# Patient Record
Sex: MALE | Race: WHITE | NOT HISPANIC OR LATINO | Employment: FULL TIME | URBAN - METROPOLITAN AREA
[De-identification: names, ages, dates, MRNs, and addresses within clinical notes are randomized per-mention and may not be internally consistent; named-entity substitution may affect disease eponyms.]

---

## 2019-08-30 ENCOUNTER — HISTORICAL (OUTPATIENT)
Dept: LAB | Facility: HOSPITAL | Age: 59
End: 2019-08-30

## 2019-08-30 LAB
EST. AVERAGE GLUCOSE BLD GHB EST-MCNC: 111 MG/DL
HBA1C MFR BLD: 5.5 % (ref 4.2–6.3)

## 2021-02-17 ENCOUNTER — HISTORICAL (OUTPATIENT)
Dept: INTENSIVE CARE | Facility: HOSPITAL | Age: 61
End: 2021-02-17

## 2022-02-18 ENCOUNTER — HISTORICAL (OUTPATIENT)
Dept: ADMINISTRATIVE | Facility: HOSPITAL | Age: 62
End: 2022-02-18

## 2022-02-18 LAB
ABS NEUT (OLG): 3.14 (ref 2.1–9.2)
ALBUMIN SERPL-MCNC: 4.1 G/DL (ref 3.4–4.8)
ALBUMIN/GLOB SERPL: 1.3 {RATIO} (ref 1.1–2)
ALP SERPL-CCNC: 51 U/L (ref 40–150)
ALT SERPL-CCNC: 27 U/L (ref 0–55)
APPEARANCE, UA: CLEAR
APTT PPP: 34.1 S (ref 23.2–33.7)
AST SERPL-CCNC: 21 U/L (ref 5–34)
BACTERIA SPEC CULT: NORMAL
BASOPHILS # BLD AUTO: 0 10*3/UL (ref 0–0.2)
BASOPHILS NFR BLD AUTO: 0 %
BILIRUB SERPL-MCNC: 1.2 MG/DL
BILIRUB UR QL STRIP: NORMAL
BILIRUBIN DIRECT+TOT PNL SERPL-MCNC: 0.4 (ref 0–0.5)
BILIRUBIN DIRECT+TOT PNL SERPL-MCNC: 0.8 (ref 0–0.8)
BUDDING YEAST: NORMAL
BUN SERPL-MCNC: 16.4 MG/DL (ref 8.4–25.7)
CALCIUM SERPL-MCNC: 9.2 MG/DL (ref 8.7–10.5)
CASTS, UA: NORMAL
CHLORIDE SERPL-SCNC: 102 MMOL/L (ref 98–107)
CO2 SERPL-SCNC: 22 MMOL/L (ref 23–31)
COLOR UR: NORMAL
CREAT SERPL-MCNC: 1.03 MG/DL (ref 0.73–1.18)
CRYSTALS: NORMAL
EOSINOPHIL # BLD AUTO: 0.1 10*3/UL (ref 0–0.9)
EOSINOPHIL NFR BLD AUTO: 2 %
ERYTHROCYTE [DISTWIDTH] IN BLOOD BY AUTOMATED COUNT: 12.4 % (ref 11.5–17)
GLOBULIN SER-MCNC: 3.1 G/DL (ref 2.4–3.5)
GLUCOSE (UA): NEGATIVE
GLUCOSE SERPL-MCNC: 103 MG/DL (ref 82–115)
HCT VFR BLD AUTO: 44.7 % (ref 42–52)
HEMOLYSIS INTERF INDEX SERPL-ACNC: 18
HGB BLD-MCNC: 16.1 G/DL (ref 14–18)
HGB UR QL STRIP: NEGATIVE
ICTERIC INTERF INDEX SERPL-ACNC: 1
INR PPP: 1 (ref 0–1.3)
KETONES UR QL STRIP: NORMAL
LEUKOCYTE ESTERASE UR QL STRIP: NEGATIVE
LIPEMIC INTERF INDEX SERPL-ACNC: 16
LYMPHOCYTES # BLD AUTO: 2 10*3/UL (ref 0.6–4.6)
LYMPHOCYTES NFR BLD AUTO: 35 %
MANUAL DIFF? (OHS): NO
MCH RBC QN AUTO: 33.8 PG (ref 27–31)
MCHC RBC AUTO-ENTMCNC: 36 G/DL (ref 33–36)
MCV RBC AUTO: 93.7 FL (ref 80–94)
MONOCYTES # BLD AUTO: 0.5 10*3/UL (ref 0.1–1.3)
MONOCYTES NFR BLD AUTO: 9 %
MRSA SCREEN BY PCR: NEGATIVE
NEUTROPHILS # BLD AUTO: 3.14 10*3/UL (ref 2.1–9.2)
NEUTROPHILS NFR BLD AUTO: 54 %
NITRITE UR QL STRIP: NEGATIVE
PH UR STRIP: 5.5 [PH] (ref 5–9)
PLATELET # BLD AUTO: 182 10*3/UL (ref 130–400)
PMV BLD AUTO: 9.7 FL (ref 9.4–12.4)
POTASSIUM SERPL-SCNC: 3.8 MMOL/L (ref 3.5–5.1)
PROBE CHECK (OHS): NORMAL
PROT SERPL-MCNC: 7.2 G/DL (ref 5.8–7.6)
PROT UR QL STRIP: NORMAL
PROTHROMBIN TIME: 13.4 S (ref 12.5–14.5)
RBC # BLD AUTO: 4.77 10*6/UL (ref 4.7–6.1)
RBC #/AREA URNS HPF: NORMAL /[HPF] (ref 0–2)
SARS-COV-2 AG RESP QL IA.RAPID: NEGATIVE
SMALL ROUND CELLS, UA: NORMAL
SODIUM SERPL-SCNC: 138 MMOL/L (ref 136–145)
SP GR UR STRIP: 1.02 (ref 1–1.03)
SPERM URNS QL MICRO: NORMAL
SQUAMOUS EPITHELIAL, UA: NORMAL (ref 0–4)
UROBILINOGEN UR STRIP-ACNC: 1
WBC # SPEC AUTO: 5.9 10*3/UL (ref 4.5–11.5)
WBC #/AREA URNS HPF: NORMAL /[HPF] (ref 0–2)

## 2022-02-21 ENCOUNTER — HISTORICAL (OUTPATIENT)
Dept: ADMINISTRATIVE | Facility: HOSPITAL | Age: 62
End: 2022-02-21

## 2022-04-07 ENCOUNTER — HISTORICAL (OUTPATIENT)
Dept: ADMINISTRATIVE | Facility: HOSPITAL | Age: 62
End: 2022-04-07
Payer: COMMERCIAL

## 2022-04-24 VITALS
SYSTOLIC BLOOD PRESSURE: 135 MMHG | WEIGHT: 271.19 LBS | BODY MASS INDEX: 36.73 KG/M2 | HEIGHT: 72 IN | DIASTOLIC BLOOD PRESSURE: 92 MMHG

## 2022-05-02 ENCOUNTER — OFFICE VISIT (OUTPATIENT)
Dept: URGENT CARE | Facility: CLINIC | Age: 62
End: 2022-05-02
Payer: COMMERCIAL

## 2022-05-02 VITALS
HEIGHT: 72 IN | DIASTOLIC BLOOD PRESSURE: 80 MMHG | WEIGHT: 251 LBS | RESPIRATION RATE: 18 BRPM | BODY MASS INDEX: 34 KG/M2 | SYSTOLIC BLOOD PRESSURE: 130 MMHG | TEMPERATURE: 99 F | OXYGEN SATURATION: 98 % | HEART RATE: 89 BPM

## 2022-05-02 DIAGNOSIS — R05.9 COUGH: ICD-10-CM

## 2022-05-02 DIAGNOSIS — J02.9 SORE THROAT: Primary | ICD-10-CM

## 2022-05-02 DIAGNOSIS — U07.1 COVID-19: ICD-10-CM

## 2022-05-02 DIAGNOSIS — R50.9 FEVER, UNSPECIFIED FEVER CAUSE: ICD-10-CM

## 2022-05-02 LAB
CTP QC/QA: YES
FLUAV AG NPH QL: NEGATIVE
FLUBV AG NPH QL: NEGATIVE
SARS-COV-2 AG RESP QL IA.RAPID: POSITIVE
SARS-COV-2 RDRP RESP QL NAA+PROBE: POSITIVE

## 2022-05-02 PROCEDURE — 87811 SARS-COV-2 COVID19 W/OPTIC: CPT | Mod: QW,,, | Performed by: FAMILY MEDICINE

## 2022-05-02 PROCEDURE — 3008F PR BODY MASS INDEX (BMI) DOCUMENTED: ICD-10-PCS | Mod: CPTII,,, | Performed by: FAMILY MEDICINE

## 2022-05-02 PROCEDURE — 87804 POCT INFLUENZA A/B: ICD-10-PCS | Mod: QW,,, | Performed by: FAMILY MEDICINE

## 2022-05-02 PROCEDURE — 3008F BODY MASS INDEX DOCD: CPT | Mod: CPTII,,, | Performed by: FAMILY MEDICINE

## 2022-05-02 PROCEDURE — 3075F PR MOST RECENT SYSTOLIC BLOOD PRESS GE 130-139MM HG: ICD-10-PCS | Mod: CPTII,,, | Performed by: FAMILY MEDICINE

## 2022-05-02 PROCEDURE — 3079F DIAST BP 80-89 MM HG: CPT | Mod: CPTII,,, | Performed by: FAMILY MEDICINE

## 2022-05-02 PROCEDURE — 87811 SARS CORONAVIRUS 2 ANTIGEN POCT, MANUAL READ: ICD-10-PCS | Mod: QW,,, | Performed by: FAMILY MEDICINE

## 2022-05-02 PROCEDURE — 1159F PR MEDICATION LIST DOCUMENTED IN MEDICAL RECORD: ICD-10-PCS | Mod: CPTII,,, | Performed by: FAMILY MEDICINE

## 2022-05-02 PROCEDURE — 99213 PR OFFICE/OUTPT VISIT, EST, LEVL III, 20-29 MIN: ICD-10-PCS | Mod: 25,,, | Performed by: FAMILY MEDICINE

## 2022-05-02 PROCEDURE — 3075F SYST BP GE 130 - 139MM HG: CPT | Mod: CPTII,,, | Performed by: FAMILY MEDICINE

## 2022-05-02 PROCEDURE — 99213 OFFICE O/P EST LOW 20 MIN: CPT | Mod: 25,,, | Performed by: FAMILY MEDICINE

## 2022-05-02 PROCEDURE — 3079F PR MOST RECENT DIASTOLIC BLOOD PRESSURE 80-89 MM HG: ICD-10-PCS | Mod: CPTII,,, | Performed by: FAMILY MEDICINE

## 2022-05-02 PROCEDURE — 1159F MED LIST DOCD IN RCRD: CPT | Mod: CPTII,,, | Performed by: FAMILY MEDICINE

## 2022-05-02 PROCEDURE — 87804 INFLUENZA ASSAY W/OPTIC: CPT | Mod: 59,QW,, | Performed by: FAMILY MEDICINE

## 2022-05-02 RX ORDER — HYDRALAZINE HYDROCHLORIDE 25 MG/1
25 TABLET, FILM COATED ORAL 2 TIMES DAILY
COMMUNITY
Start: 2021-10-13

## 2022-05-02 RX ORDER — METOPROLOL TARTRATE 50 MG/1
50 TABLET ORAL 2 TIMES DAILY
COMMUNITY
Start: 2022-02-23

## 2022-05-02 RX ORDER — LOSARTAN POTASSIUM AND HYDROCHLOROTHIAZIDE 25; 100 MG/1; MG/1
1 TABLET ORAL DAILY
COMMUNITY
Start: 2021-11-03

## 2022-05-02 RX ORDER — RIVAROXABAN 20 MG/1
20 TABLET, FILM COATED ORAL DAILY
COMMUNITY
Start: 2022-04-19

## 2022-05-02 RX ORDER — SPIRONOLACTONE 25 MG/1
25 TABLET ORAL DAILY
COMMUNITY
Start: 2022-04-13

## 2022-05-02 NOTE — PROGRESS NOTES
Subjective:       Patient ID: Spenser Craven is a 62 y.o. male.    Vitals:  vitals were not taken for this visit.     Chief Complaint: No chief complaint on file.    HPI  ROS    Objective:      Physical Exam      Assessment:       No diagnosis found.      Plan:         There are no diagnoses linked to this encounter.

## 2022-05-02 NOTE — PROGRESS NOTES
Subjective:       Patient ID: Spenser Craven is a 62 y.o. male.    Vitals:  height is 6' (1.829 m) and weight is 113.9 kg (251 lb). His temperature is 98.7 °F (37.1 °C). His blood pressure is 130/80 and his pulse is 89. His respiration is 18 and oxygen saturation is 98%.     Chief Complaint: Fever (Patient presents to clinic with c/o sinus problems, fever, ST, and cough. Sx started Saturday.), Sinus Problem, Cough, Sore Throat, and Fatigue    Fever   This is a new problem. The problem occurs intermittently. The problem has been unchanged. The maximum temperature noted was 100 to 100.9 F. The temperature was taken using an oral thermometer. Associated symptoms include coughing and a sore throat. He has tried acetaminophen and fluids for the symptoms. The treatment provided no relief.   Sinus Problem  This is a new problem. The problem is unchanged. The maximum temperature recorded prior to his arrival was 100.4 - 100.9 F. He is experiencing no pain. Associated symptoms include coughing and a sore throat. Past treatments include acetaminophen, lying down, oral decongestants, saline sprays, sitting up and spray decongestants. The treatment provided no relief.   Cough  This is a new problem. The problem has been unchanged. The problem occurs constantly. The cough is productive of sputum. Associated symptoms include a fever and a sore throat. The symptoms are aggravated by dust, exercise, fumes, pollens, lying down and stress. Risk factors for lung disease include smoking/tobacco exposure. He has tried body position changes, OTC cough suppressant, prescription cough suppressant and rest for the symptoms. The treatment provided no relief.   Sore Throat   This is a new problem. The problem has been unchanged. The maximum temperature recorded prior to his arrival was 100.4 - 100.9 F. Associated symptoms include coughing. He has tried cool liquids, gargles and acetaminophen for the symptoms. The treatment provided no relief.    Fatigue  This is a new problem. The problem occurs constantly. The problem has been unchanged. Associated symptoms include coughing, fatigue, a fever and a sore throat. The symptoms are aggravated by drinking and eating. He has tried acetaminophen, drinking, lying down, position changes, relaxation, rest and sleep for the symptoms. The treatment provided no relief.       Constitution: Positive for fatigue and fever.   HENT: Positive for sore throat.    Respiratory: Positive for cough.        Objective:      Physical Exam   Constitutional: He is oriented to person, place, and time. He appears well-developed. No distress.   HENT:   Ears:   Right Ear: External ear normal.   Left Ear: External ear normal.   Nose: Rhinorrhea present.   Mouth/Throat: Uvula is midline and mucous membranes are normal. No uvula swelling. Cobblestoning present. No posterior oropharyngeal edema. Tonsils are 0 on the right. Tonsils are 0 on the left. No tonsillar exudate.   Eyes: Pupils are equal, round, and reactive to light. Right eye exhibits no discharge. Left eye exhibits no discharge.   Neck: Neck supple. No tracheal deviation present.   Cardiovascular: Normal rate, regular rhythm and normal heart sounds.   No murmur heard.  Pulmonary/Chest: Effort normal and breath sounds normal. No stridor. No respiratory distress. He has no wheezes.   Lymphadenopathy:     He has no cervical adenopathy.   Neurological: He is alert and oriented to person, place, and time.   Skin: Skin is warm and dry.   Nursing note and vitals reviewed.        Assessment:       1. Sore throat    2. Fever, unspecified fever cause    3. Cough    4. COVID-19          Plan:         Sore throat  -     POCT Influenza A/B  -     SARS Coronavirus 2 Antigen, POCT Manual Read    Fever, unspecified fever cause  -     POCT Influenza A/B  -     SARS Coronavirus 2 Antigen, POCT Manual Read    Cough  -     POCT Influenza A/B  -     SARS Coronavirus 2 Antigen, POCT Manual  Read    COVID-19  -     POCT Influenza A/B  -     SARS Coronavirus 2 Antigen, POCT Manual Read         COVID positive.  Flu negative.   Quarantine for 5 days at home, if symptoms improve can stop home quarantine and use mask when around others for additional 5 days.  ER precautions given.

## 2022-05-03 DIAGNOSIS — U07.1 COVID-19 VIRUS DETECTED: ICD-10-CM

## 2022-05-15 RX ORDER — CETIRIZINE HYDROCHLORIDE 10 MG/1
10 TABLET ORAL DAILY
COMMUNITY

## 2022-05-15 RX ORDER — CYCLOBENZAPRINE HCL 10 MG
10 TABLET ORAL 3 TIMES DAILY PRN
COMMUNITY

## 2022-05-15 RX ORDER — AZELASTINE 1 MG/ML
1 SPRAY, METERED NASAL 2 TIMES DAILY
COMMUNITY

## 2022-05-15 RX ORDER — TADALAFIL 5 MG/1
5 TABLET ORAL DAILY PRN
COMMUNITY

## 2022-05-17 ENCOUNTER — OFFICE VISIT (OUTPATIENT)
Dept: NEUROLOGY | Facility: CLINIC | Age: 62
End: 2022-05-17
Payer: COMMERCIAL

## 2022-05-17 VITALS
SYSTOLIC BLOOD PRESSURE: 130 MMHG | WEIGHT: 250 LBS | HEIGHT: 72 IN | DIASTOLIC BLOOD PRESSURE: 80 MMHG | BODY MASS INDEX: 33.86 KG/M2

## 2022-05-17 DIAGNOSIS — G47.33 OSA (OBSTRUCTIVE SLEEP APNEA): Primary | ICD-10-CM

## 2022-05-17 PROCEDURE — 3008F PR BODY MASS INDEX (BMI) DOCUMENTED: ICD-10-PCS | Mod: CPTII,S$GLB,, | Performed by: NURSE PRACTITIONER

## 2022-05-17 PROCEDURE — 99999 PR PBB SHADOW E&M-EST. PATIENT-LVL III: CPT | Mod: PBBFAC,,, | Performed by: NURSE PRACTITIONER

## 2022-05-17 PROCEDURE — 1159F PR MEDICATION LIST DOCUMENTED IN MEDICAL RECORD: ICD-10-PCS | Mod: CPTII,S$GLB,, | Performed by: NURSE PRACTITIONER

## 2022-05-17 PROCEDURE — 99213 OFFICE O/P EST LOW 20 MIN: CPT | Mod: S$GLB,,, | Performed by: NURSE PRACTITIONER

## 2022-05-17 PROCEDURE — 99213 PR OFFICE/OUTPT VISIT, EST, LEVL III, 20-29 MIN: ICD-10-PCS | Mod: S$GLB,,, | Performed by: NURSE PRACTITIONER

## 2022-05-17 PROCEDURE — 3008F BODY MASS INDEX DOCD: CPT | Mod: CPTII,S$GLB,, | Performed by: NURSE PRACTITIONER

## 2022-05-17 PROCEDURE — 99999 PR PBB SHADOW E&M-EST. PATIENT-LVL III: ICD-10-PCS | Mod: PBBFAC,,, | Performed by: NURSE PRACTITIONER

## 2022-05-17 PROCEDURE — 3079F PR MOST RECENT DIASTOLIC BLOOD PRESSURE 80-89 MM HG: ICD-10-PCS | Mod: CPTII,S$GLB,, | Performed by: NURSE PRACTITIONER

## 2022-05-17 PROCEDURE — 1159F MED LIST DOCD IN RCRD: CPT | Mod: CPTII,S$GLB,, | Performed by: NURSE PRACTITIONER

## 2022-05-17 PROCEDURE — 3075F PR MOST RECENT SYSTOLIC BLOOD PRESS GE 130-139MM HG: ICD-10-PCS | Mod: CPTII,S$GLB,, | Performed by: NURSE PRACTITIONER

## 2022-05-17 PROCEDURE — 3079F DIAST BP 80-89 MM HG: CPT | Mod: CPTII,S$GLB,, | Performed by: NURSE PRACTITIONER

## 2022-05-17 PROCEDURE — 3075F SYST BP GE 130 - 139MM HG: CPT | Mod: CPTII,S$GLB,, | Performed by: NURSE PRACTITIONER

## 2022-05-17 RX ORDER — ATORVASTATIN CALCIUM 10 MG/1
10 TABLET, FILM COATED ORAL
COMMUNITY
Start: 2022-04-20

## 2022-05-17 NOTE — PROGRESS NOTES
Established SANDIP Patient   SUBJECTIVE:  Patient ID: Spenser Craven   Chief Complaint: F/U SANDIP. (Wears CPAP qhs and is tolerating it well. Sleeps 8 hrs a night and sleeps all night. Awakens refreshed and occs has EDS. Pt states he was taking 2 naps a day he states he was fatigued from his A FIB. Sleeps better and longer with the CPAP. Carr s not change mask and tubing on a regular basis. States that his mask sometimes leaks due to the position his jaw is in. [facility].)    History of Present Illness:  Spenser Craven is a 62 y.o. male who presents for SANDIP follow-up        05/17/2022 - Interval History:    Here for PAP follow up. Wears CPAP qhs and is tolerating it well. Sleeps better and longer with the CPAP.     Sleeps 8 hrs a night and sleeps all night.     Awakens refreshed and occasionally has EDS. Pt states he was taking 2 naps a day (he states he had fatigue d/t  A FIB. Last month had ablation which was effective. EDS resolved).     States that his mask sometimes leaks due to the position his jaw is in.   [facility]. Able to get supplies    Current Medications:    Current Outpatient Medications:     atorvastatin (LIPITOR) 10 MG tablet, Take 10 mg by mouth., Disp: , Rfl:     azelastine (ASTELIN) 137 mcg (0.1 %) nasal spray, 1 spray by Nasal route 2 (two) times daily., Disp: , Rfl:     cetirizine (ZYRTEC) 10 MG tablet, Take 10 mg by mouth once daily., Disp: , Rfl:     cyclobenzaprine (FLEXERIL) 10 MG tablet, Take 10 mg by mouth 3 (three) times daily as needed for Muscle spasms., Disp: , Rfl:     hydrALAZINE (APRESOLINE) 25 MG tablet, Take 25 mg by mouth 2 (two) times a day., Disp: , Rfl:     losartan-hydrochlorothiazide 100-25 mg (HYZAAR) 100-25 mg per tablet, Take 1 tablet by mouth once daily., Disp: , Rfl:     metoprolol tartrate (LOPRESSOR) 50 MG tablet, Take 50 mg by mouth 2 (two) times daily., Disp: , Rfl:     spironolactone (ALDACTONE) 25 MG tablet, Take 25 mg by mouth once daily., Disp: , Rfl:      tadalafiL (CIALIS) 5 MG tablet, Take 5 mg by mouth daily as needed for Erectile Dysfunction., Disp: , Rfl:     XARELTO 20 mg Tab, Take 20 mg by mouth once daily., Disp: , Rfl:     Review of Systems - as per HPI, otherwise a balanced 10 systems review is negative.    OBJECTIVE:  Vitals:  /80   Ht 6' (1.829 m)   Wt 113.4 kg (250 lb)   BMI 33.91 kg/m²      Physical Exam:  Constitutional: he appears well-developed and well-nourished. he is well groomed. NAD   HENT:    Head: Normocephalic and atraumatic  Eyes: Conjunctivae and EOM are normal  Heart: RRR  Pulmonary: Normal lung sounds  Musculoskeletal: Normal range of motion.   Skin: Skin is warm and dry.  Psychiatric: Mood and affect are normal    Neuro: Patient is alert and oriented to person, place, and time. Language is intact and fluent. Speech is clear and fluent. Recent and remote memory are intact.  Normal attention and concentration.  Facial movement is symmetric. Moves all 4 extremities against gravity. Gait and station normal.      Review of Data:      Compliance Report  Last 30 days  Usage- 100%  Usage > 4 hrs - 100%  AHI - 1.6    Mobile today- 3    Labs:  Office Visit on 05/02/2022   Component Date Value Ref Range Status    Rapid Influenza A Ag 05/02/2022 Negative  Negative Final    Rapid Influenza B Ag 05/02/2022 Negative  Negative Final     Acceptable 05/02/2022 Yes   Final    SARS Coronavirus 2 Antigen 05/02/2022 Positive (A) Negative Final-Edited     Acceptable 05/02/2022 Yes   Final-Edited    POC Rapid COVID 05/02/2022 Positive (A) Negative Final     Acceptable 05/02/2022 Yes   Final   Historical on 02/18/2022   Component Date Value Ref Range Status    WBC 02/18/2022 5.9  4.5 - 11.5 Final    RBC 02/18/2022 4.77  4.70 - 6.10 Final    Hgb 02/18/2022 16.1  14.0 - 18.0 Final    Hct 02/18/2022 44.7  42.0 - 52.0 Final    MCV 02/18/2022 93.7  80.0 - 94.0 Final    MCH 02/18/2022 33.8  27.0 -  31.0 Final    MCHC 02/18/2022 36.0  33.0 - 36.0 Final    RDW 02/18/2022 12.4  11.5 - 17.0 Final    Platelet 02/18/2022 182  130 - 400 Final    MPV 02/18/2022 9.7  9.4 - 12.4 Final    Abs Neut 02/18/2022 3.14  2.10 - 9.20 Final    Manual Diff? 02/18/2022 No   Final    Neut % 02/18/2022 54   Final    Lymph % 02/18/2022 35   Final    Mono Auto 02/18/2022 9   Final    Eos Auto 02/18/2022 2   Final    Basophil Auto 02/18/2022 0   Final    Lymph # 02/18/2022 2.0  0.6 - 4.6 Final    Neut # 02/18/2022 3.14  2.10 - 9.20 Final    Abs Mono 02/18/2022 0.5  0.1 - 1.3 Final    Abs Eos 02/18/2022 0.1  0.0 - 0.9 Final    Abs Baso 02/18/2022 0.0  0.0 - 0.2 Final    Color, UA 02/18/2022 DK YELLOW  YELLOW Final    Appearance, UA 02/18/2022 CLEAR  Clear Final    Specific Gravity,UA 02/18/2022 1.025  1.000 - 1.032 Final    pH, UA 02/18/2022 5.5  5.0 - 9.0 Final    Protein, UA 02/18/2022 Trace  Negative Final    Glucose, UA 02/18/2022 Negative  Negative Final    Ketones, UA 02/18/2022 Trace  Negative Final    Bilirubin (UA) 02/18/2022 1+  Negative Final    Occult Blood UA 02/18/2022 Negative  Negative Final    Urobilinogen, UA 02/18/2022 1.0   Final    Nitrite, UA 02/18/2022 Negative  Negative Final    Leukocytes, UA 02/18/2022 Negative  Negative Final    WBC, UA 02/18/2022 NONE SEEN  0 - 2 Final    RBC, UA 02/18/2022 NONE SEEN  0 - 2 Final    Squam Epithel, UA 02/18/2022 NONE SEEN  0 - 4 Final    Bacteria 02/18/2022 NONE SEEN  None Seen Final    Yeast, UA 02/18/2022 NOT PRESENT   Final    Sperm, UA 02/18/2022 NOT PRESENT   Final    Crystals 02/18/2022 NOT PRESENT   Final    Small Round Cells, UA 02/18/2022 NOT PRESENT   Final    CASTS, UA 02/18/2022 NONE SEEN   Final    Sodium Level 02/18/2022 138  136 - 145 Final    Potassium Level 02/18/2022 3.8  3.5 - 5.1 Final    Chloride 02/18/2022 102  98 - 107 Final    Carbon Dioxide 02/18/2022 22  23 - 31 Final    Glucose Level 02/18/2022 103  82 - 115  Final    Blood Urea Nitrogen 02/18/2022 16.4  8.4 - 25.7 Final    Creatinine 02/18/2022 1.03  0.73 - 1.18 Final    Calcium Level Total 02/18/2022 9.2  8.7 - 10.5 Final    Albumin Level 02/18/2022 4.1  3.4 - 4.8 Final    Protein Total 02/18/2022 7.2  5.8 - 7.6 Final    Globulin 02/18/2022 3.1  2.4 - 3.5 Final    Albumin/Globulin Ratio 02/18/2022 1.3  1.1 - 2.0 Final    Alkaline Phosphatase 02/18/2022 51  40 - 150 Final    Bilirubin Total 02/18/2022 1.2  <=1.5 Final    Bilirubin Direct 02/18/2022 0.4  0.0 - 0.5 Final    Bilirubin Indirect 02/18/2022 0.80  0.00 - 0.80 Final    Aspartate Aminotransferase 02/18/2022 21  5 - 34 Final    Alanine Aminotransferase 02/18/2022 27  0 - 55 Final    Hemolysis 02/18/2022 18   Final    Icterus 02/18/2022 1   Final    Lipemia 02/18/2022 16   Final    Estimated GFR- 02/18/2022 >60   Final    Estimated GFR-Non  02/18/2022 >60   Final    PT 02/18/2022 13.4  12.5 - 14.5 Final    INR 02/18/2022 1.0  0.0 - 1.3 Final    PTT 02/18/2022 34.1  23.2 - 33.7 Final    SARS Coronavirus 2 Antigen 02/18/2022 NEGATIVE  NEGATIVE Final    MRSA SCREEN BY PCR 02/18/2022 Negative  Negative Final    Probe Check 02/18/2022 Pass   Final          ASSESSMENT:  1. SANDIP (obstructive sleep apnea)        PLAN:    - Encouraged continued use of PAP, > 4 hrs nightly.  - Drowsy driving may still occur despite PAP use.   - Clinical follow up and replacement of supplies discussed.  - RTC in 1 yr    Questions and concerns were sought and answered to the patient's stated verbal satisfaction.    The patient verbalizes understanding and agreement with the above stated treatment plan.   Dr. Bush was available during today's encounter.     Items discussed include acute and/or chronic neurological, sleep, or other issues and their attendant differential diagnoses.  Potential for additional testing, treatment options, and prognosis also discussed.    _*__single dx  ___multiple issues/ diagnoses  ___ low _*_mod ___ high complexity of data  _*__low __mod ___ high risks     Medical Decision Making (MDM) used for CPT choice:  _*__low  ___moderate  ____high      VINICIO Dumont  [facility]  [phone]

## 2022-10-18 ENCOUNTER — OFFICE VISIT (OUTPATIENT)
Dept: URGENT CARE | Facility: CLINIC | Age: 62
End: 2022-10-18
Payer: COMMERCIAL

## 2022-10-18 VITALS
WEIGHT: 250 LBS | SYSTOLIC BLOOD PRESSURE: 122 MMHG | HEIGHT: 72 IN | TEMPERATURE: 98 F | DIASTOLIC BLOOD PRESSURE: 72 MMHG | HEART RATE: 87 BPM | RESPIRATION RATE: 18 BRPM | BODY MASS INDEX: 33.86 KG/M2 | OXYGEN SATURATION: 100 %

## 2022-10-18 DIAGNOSIS — J02.9 SORE THROAT: Primary | ICD-10-CM

## 2022-10-18 DIAGNOSIS — H65.191 ACUTE EFFUSION OF RIGHT EAR: ICD-10-CM

## 2022-10-18 LAB
CTP QC/QA: YES
S PYO RRNA THROAT QL PROBE: NEGATIVE

## 2022-10-18 PROCEDURE — 1160F PR REVIEW ALL MEDS BY PRESCRIBER/CLIN PHARMACIST DOCUMENTED: ICD-10-PCS | Mod: CPTII,,, | Performed by: REGISTERED NURSE

## 2022-10-18 PROCEDURE — 1159F PR MEDICATION LIST DOCUMENTED IN MEDICAL RECORD: ICD-10-PCS | Mod: CPTII,,, | Performed by: REGISTERED NURSE

## 2022-10-18 PROCEDURE — 87880 POCT RAPID STREP A: ICD-10-PCS | Mod: QW,,, | Performed by: REGISTERED NURSE

## 2022-10-18 PROCEDURE — 3074F SYST BP LT 130 MM HG: CPT | Mod: CPTII,,, | Performed by: REGISTERED NURSE

## 2022-10-18 PROCEDURE — 1160F RVW MEDS BY RX/DR IN RCRD: CPT | Mod: CPTII,,, | Performed by: REGISTERED NURSE

## 2022-10-18 PROCEDURE — 3078F DIAST BP <80 MM HG: CPT | Mod: CPTII,,, | Performed by: REGISTERED NURSE

## 2022-10-18 PROCEDURE — 1159F MED LIST DOCD IN RCRD: CPT | Mod: CPTII,,, | Performed by: REGISTERED NURSE

## 2022-10-18 PROCEDURE — 3074F PR MOST RECENT SYSTOLIC BLOOD PRESSURE < 130 MM HG: ICD-10-PCS | Mod: CPTII,,, | Performed by: REGISTERED NURSE

## 2022-10-18 PROCEDURE — 99212 PR OFFICE/OUTPT VISIT, EST, LEVL II, 10-19 MIN: ICD-10-PCS | Mod: S$PBB,25,, | Performed by: REGISTERED NURSE

## 2022-10-18 PROCEDURE — 87880 STREP A ASSAY W/OPTIC: CPT | Mod: QW,,, | Performed by: REGISTERED NURSE

## 2022-10-18 PROCEDURE — 99212 OFFICE O/P EST SF 10 MIN: CPT | Mod: S$PBB,25,, | Performed by: REGISTERED NURSE

## 2022-10-18 PROCEDURE — 3078F PR MOST RECENT DIASTOLIC BLOOD PRESSURE < 80 MM HG: ICD-10-PCS | Mod: CPTII,,, | Performed by: REGISTERED NURSE

## 2022-10-18 RX ORDER — SOTALOL HYDROCHLORIDE 80 MG/1
TABLET ORAL
COMMUNITY
Start: 2022-07-27

## 2022-10-18 NOTE — PROGRESS NOTES
Subjective:       Patient ID: Spenser Craven is a 62 y.o. male.    Vitals:  height is 6' (1.829 m) and weight is 113.4 kg (250 lb). His temperature is 98.2 °F (36.8 °C). His blood pressure is 122/72 and his pulse is 87. His respiration is 18 and oxygen saturation is 100%.     Chief Complaint: Sinus Problem (Sore throat, rt ear ache, cough x yesterday :started tamiflu on Saturday- never got tested for flu but was exposed to it so dr called it in )    Patient presents to clinic complaining of a sore throat and right ear pain x2 days.  Patient denies fever, chills, and malaise.    Constitution: Negative. Negative for chills and fever.   HENT:  Positive for ear pain and sore throat.    Neck: neck negative.   Cardiovascular: Negative.    Eyes: Negative.    Respiratory: Negative.  Negative for cough and shortness of breath.    Gastrointestinal: Negative.    Endocrine: negative.   Genitourinary: Negative.    Musculoskeletal: Negative.    Skin: Negative.    Allergic/Immunologic: Negative.    Neurological: Negative.    Hematologic/Lymphatic: Negative.    Psychiatric/Behavioral: Negative.       Objective:      Physical Exam   Constitutional: He is oriented to person, place, and time.   HENT:   Head: Normocephalic.   Ears:   Right Ear: Hearing and ear canal normal. A middle ear effusion is present.   Left Ear: Tympanic membrane normal.   Nose: Nose normal.   Mouth/Throat: Mucous membranes are moist. Posterior oropharyngeal erythema present.   Eyes: Pupils are equal, round, and reactive to light.   Neck: Neck supple.   Cardiovascular: Normal rate, regular rhythm, normal heart sounds and normal pulses.   Pulmonary/Chest: Effort normal and breath sounds normal.   Abdominal: Normal appearance. Soft. flat abdomen   Musculoskeletal: Normal range of motion.         General: Normal range of motion.   Neurological: no focal deficit. He is alert and oriented to person, place, and time.   Skin: Skin is warm and dry. Capillary refill  takes less than 2 seconds.   Psychiatric: His behavior is normal. Mood, judgment and thought content normal.       Assessment:       1. Sore throat    2. Acute effusion of right ear            Plan:   Strep negative  Sore throat-take over-the-counter Tylenol for pain, warm saltwater gargles and over-the-counter Chloraseptic spray as directed  Right ear effusion-take over-the-counter Zyrtec or Claritin as directed  Return to clinic if symptoms worsen        Sore throat  -     POCT rapid strep A    Acute effusion of right ear

## 2023-05-15 NOTE — PROGRESS NOTES
Established SANDIP Patient   SUBJECTIVE:    Patient ID: Spenser Craven , 63 y.o.    Past Medical History:   Diagnosis Date    A-fib     BPH (benign prostatic hyperplasia)     HTN (hypertension)     MVP (mitral valve prolapse)        Past Surgical History:   Procedure Laterality Date    CORONARY ARTERY BYPASS GRAFT      LUMBAR DISC SURGERY      MITRAL VALVE REPLACEMENT         Review of patient's allergies indicates:   Allergen Reactions    Ace inhibitors Swelling    Simvastatin Other (See Comments)       Chief Complaint: SANDIP f/u    History of Present Illness:     Wears CPAP nightly and is tolerating it well. Sleeps better with CPAP. Sleeps 9 hrs a night and sleeps all night. Awakens refreshed and denies EDS. Naps 20min-1 1/2 hr and occs wears CPAP. Denies any issues with the mask or the equipment. Changes mask and tubing on a regular basis.     Recently had cardioversion, MV replacement    Review of Systems - as per HPI, otherwise a balanced 10 systems review is negative.      Current Medications:  Current Outpatient Medications   Medication Instructions    aspirin (ECOTRIN) 81 mg, Oral    atorvastatin (LIPITOR) 10 mg, Oral    azelastine (ASTELIN) 137 mcg (0.1 %) nasal spray 1 spray, Nasal, 2 times daily    cetirizine (ZYRTEC) 10 mg, Oral, Daily    cyclobenzaprine (FLEXERIL) 10 mg, Oral, 3 times daily PRN    diltiaZEM (CARDIZEM CD) 180 mg, Oral, Daily    hydrALAZINE (APRESOLINE) 25 mg, Oral, 2 times daily    losartan-hydrochlorothiazide 100-25 mg (HYZAAR) 100-25 mg per tablet 1 tablet, Oral, Daily    metoprolol tartrate (LOPRESSOR) 25 MG tablet metoprolol tartrate 25 mg tablet   TAKE 2 TABLETS BY MOUTH TWICE A DAY    metoprolol tartrate (LOPRESSOR) 50 mg, Oral, 2 times daily    rosuvastatin (CRESTOR) 10 MG tablet once a day    sotaloL (BETAPACE) 80 MG tablet sotalol 80 mg tablet    spironolactone (ALDACTONE) 25 mg, Oral, Daily    tadalafiL (CIALIS) 5 mg, Oral, Daily PRN    XARELTO 20 mg, Oral, Daily          OBJECTIVE:    Vitals:  /82   Ht 6' (1.829 m)   Wt 113.4 kg (250 lb)   BMI 33.91 kg/m²      Physical Exam:  Constitutional  he appears well-developed and well-nourished. he is well groomed.    Accompanied by - self  Appearance - well appearing, no apparent distress, unassisted  Heart - RRR auscultated without murmur  Lungs - CTA   Skin- no obvious lesions noted    Neurologic  Cortical function - The patient is alert, attentive, and oriented  Speech - clear   Cranial nerves:  CN 3, 4, 6 EOMs - normal. No ptosis or lateral gaze deviation  CN 7 - no face asymmetry; normal eye closure and smile  CN 8 - hearing is grossly normal  Motor - grossly normal  Gait - unassisted; posture upright. gait is steady with normal steps    Review of Data:      PAP Compliance Report  Last 30 days  Usage- 100  Usage > 4 hrs - 100  AHI - 0.7          ASSESSMENT /PLAN:    Problem List Items Addressed This Visit          Other    SANDIP (obstructive sleep apnea) - Primary    - Will change autoPAP pressure range to 7-15cm  - Continues to benefit from PAP therapy. Encouraged nightly use of PAP.   - Drowsy driving may still occur despite PAP use.   - F/u in 1 yr             Questions and concerns were sought and answered to the patient's stated verbal satisfaction.    The patient verbalizes understanding and agreement with the above stated treatment plan.   Dr. Bush was available during today's encounter.     Items discussed include acute and/or chronic neurological, sleep, or other issues and their attendant differential diagnoses.  Potential for additional testing, treatment options, and prognosis also discussed.    __*_single dx ___multiple issues/ diagnoses  ___ low __* mod ___ high complexity of data  __*_low __mod ___ high risks     Medical Decision Making (MDM) used for CPT choice:  _*__low  ___moderate  ____high        VINICIO Dumont  [facility]  [phone]

## 2023-05-16 ENCOUNTER — OFFICE VISIT (OUTPATIENT)
Dept: NEUROLOGY | Facility: CLINIC | Age: 63
End: 2023-05-16
Payer: COMMERCIAL

## 2023-05-16 VITALS
SYSTOLIC BLOOD PRESSURE: 128 MMHG | HEIGHT: 72 IN | BODY MASS INDEX: 33.86 KG/M2 | WEIGHT: 250 LBS | DIASTOLIC BLOOD PRESSURE: 82 MMHG

## 2023-05-16 DIAGNOSIS — G47.33 OSA (OBSTRUCTIVE SLEEP APNEA): Primary | ICD-10-CM

## 2023-05-16 PROCEDURE — 3079F DIAST BP 80-89 MM HG: CPT | Mod: CPTII,S$GLB,, | Performed by: NURSE PRACTITIONER

## 2023-05-16 PROCEDURE — 3008F PR BODY MASS INDEX (BMI) DOCUMENTED: ICD-10-PCS | Mod: CPTII,S$GLB,, | Performed by: NURSE PRACTITIONER

## 2023-05-16 PROCEDURE — 99213 PR OFFICE/OUTPT VISIT, EST, LEVL III, 20-29 MIN: ICD-10-PCS | Mod: S$GLB,,, | Performed by: NURSE PRACTITIONER

## 2023-05-16 PROCEDURE — 99213 OFFICE O/P EST LOW 20 MIN: CPT | Mod: S$GLB,,, | Performed by: NURSE PRACTITIONER

## 2023-05-16 PROCEDURE — 99999 PR PBB SHADOW E&M-EST. PATIENT-LVL III: ICD-10-PCS | Mod: PBBFAC,,, | Performed by: NURSE PRACTITIONER

## 2023-05-16 PROCEDURE — 3008F BODY MASS INDEX DOCD: CPT | Mod: CPTII,S$GLB,, | Performed by: NURSE PRACTITIONER

## 2023-05-16 PROCEDURE — 3079F PR MOST RECENT DIASTOLIC BLOOD PRESSURE 80-89 MM HG: ICD-10-PCS | Mod: CPTII,S$GLB,, | Performed by: NURSE PRACTITIONER

## 2023-05-16 PROCEDURE — 1159F PR MEDICATION LIST DOCUMENTED IN MEDICAL RECORD: ICD-10-PCS | Mod: CPTII,S$GLB,, | Performed by: NURSE PRACTITIONER

## 2023-05-16 PROCEDURE — 3074F SYST BP LT 130 MM HG: CPT | Mod: CPTII,S$GLB,, | Performed by: NURSE PRACTITIONER

## 2023-05-16 PROCEDURE — 99999 PR PBB SHADOW E&M-EST. PATIENT-LVL III: CPT | Mod: PBBFAC,,, | Performed by: NURSE PRACTITIONER

## 2023-05-16 PROCEDURE — 1159F MED LIST DOCD IN RCRD: CPT | Mod: CPTII,S$GLB,, | Performed by: NURSE PRACTITIONER

## 2023-05-16 PROCEDURE — 3074F PR MOST RECENT SYSTOLIC BLOOD PRESSURE < 130 MM HG: ICD-10-PCS | Mod: CPTII,S$GLB,, | Performed by: NURSE PRACTITIONER

## 2023-05-16 RX ORDER — METOPROLOL TARTRATE 25 MG/1
TABLET, FILM COATED ORAL
COMMUNITY

## 2023-05-16 RX ORDER — ASPIRIN 81 MG/1
81 TABLET ORAL
COMMUNITY
Start: 2023-03-15

## 2023-05-16 RX ORDER — DILTIAZEM HYDROCHLORIDE 120 MG/1
180 CAPSULE, COATED, EXTENDED RELEASE ORAL DAILY
COMMUNITY

## 2023-05-16 RX ORDER — ROSUVASTATIN CALCIUM 10 MG/1
TABLET, COATED ORAL
COMMUNITY
Start: 2023-05-05

## 2024-05-13 ENCOUNTER — PATIENT MESSAGE (OUTPATIENT)
Dept: NEUROLOGY | Facility: CLINIC | Age: 64
End: 2024-05-13
Payer: COMMERCIAL

## 2024-05-14 ENCOUNTER — OFFICE VISIT (OUTPATIENT)
Dept: NEUROLOGY | Facility: CLINIC | Age: 64
End: 2024-05-14
Payer: COMMERCIAL

## 2024-05-14 VITALS
BODY MASS INDEX: 35.89 KG/M2 | HEIGHT: 72 IN | DIASTOLIC BLOOD PRESSURE: 82 MMHG | SYSTOLIC BLOOD PRESSURE: 118 MMHG | WEIGHT: 265 LBS

## 2024-05-14 DIAGNOSIS — G47.33 OSA (OBSTRUCTIVE SLEEP APNEA): Primary | ICD-10-CM

## 2024-05-14 PROCEDURE — 99999 PR PBB SHADOW E&M-EST. PATIENT-LVL III: CPT | Mod: PBBFAC,,, | Performed by: NURSE PRACTITIONER

## 2024-05-14 PROCEDURE — 1101F PT FALLS ASSESS-DOCD LE1/YR: CPT | Mod: CPTII,S$GLB,, | Performed by: NURSE PRACTITIONER

## 2024-05-14 PROCEDURE — 3008F BODY MASS INDEX DOCD: CPT | Mod: CPTII,S$GLB,, | Performed by: NURSE PRACTITIONER

## 2024-05-14 PROCEDURE — 1159F MED LIST DOCD IN RCRD: CPT | Mod: CPTII,S$GLB,, | Performed by: NURSE PRACTITIONER

## 2024-05-14 PROCEDURE — 3079F DIAST BP 80-89 MM HG: CPT | Mod: CPTII,S$GLB,, | Performed by: NURSE PRACTITIONER

## 2024-05-14 PROCEDURE — 99213 OFFICE O/P EST LOW 20 MIN: CPT | Mod: S$GLB,,, | Performed by: NURSE PRACTITIONER

## 2024-05-14 PROCEDURE — 3074F SYST BP LT 130 MM HG: CPT | Mod: CPTII,S$GLB,, | Performed by: NURSE PRACTITIONER

## 2024-05-14 PROCEDURE — 3288F FALL RISK ASSESSMENT DOCD: CPT | Mod: CPTII,S$GLB,, | Performed by: NURSE PRACTITIONER

## 2024-05-14 RX ORDER — ROSUVASTATIN CALCIUM 40 MG/1
40 TABLET, COATED ORAL DAILY
COMMUNITY
Start: 2024-04-30

## 2024-05-14 NOTE — PROGRESS NOTES
Established SANDIP Patient   SUBJECTIVE:    Patient ID: Spenser Craven , 64 y.o.    Past Medical History:   Diagnosis Date    A-fib     BPH (benign prostatic hyperplasia)     HTN (hypertension)     MVP (mitral valve prolapse)        Past Surgical History:   Procedure Laterality Date    CARDIAC ELECTROPHYSIOLOGY STUDY AND ABLATION      CORONARY ARTERY BYPASS GRAFT      LUMBAR DISC SURGERY      MITRAL VALVE REPLACEMENT         Review of patient's allergies indicates:   Allergen Reactions    Ace inhibitors Swelling    Simvastatin Other (See Comments)       Chief Complaint: SANDIP f/u    History of Present Illness:     Here for PAP follow up. Sleeping well at night with PAP. Reports using Pogojo nightly to help with sleep. Reports its working well   Feels better the following day after PAP use; denies EDS.     Denies problems with PAP machine. Able to get supplies    Review of Systems - as per HPI, otherwise a balanced 10 systems review is negative.      Current Medications:  Current Outpatient Medications   Medication Instructions    aspirin (ECOTRIN) 81 mg, Oral, Daily    losartan-hydrochlorothiazide 100-25 mg (HYZAAR) 100-25 mg per tablet 1 tablet, Oral, Daily    rosuvastatin (CRESTOR) 40 mg, Oral, Daily    spironolactone (ALDACTONE) 25 mg, Oral, Daily       OBJECTIVE:    Vitals:  /82 (BP Location: Left arm, Patient Position: Sitting)   Ht 6' (1.829 m)   Wt 120.2 kg (265 lb)   BMI 35.94 kg/m²      Physical Exam:  Constitutional  he appears well-developed and well-nourished. he is well groomed.    Accompanied by - self  Appearance - well appearing, no apparent distress, unassisted  Heart - RRR auscultated without murmur  Lungs - CTA   Skin- no obvious lesions noted    Neurologic  Cortical function - The patient is alert, attentive   Speech - clear   Cranial nerves:  CN 3, 4, 6 EOMs - normal. No ptosis or lateral gaze deviation  CN 7 - no face asymmetry   CN 8 - hearing is grossly normal  Gait -  unassisted; posture upright. gait is steady with normal steps    Review of Data:      PAP Compliance Report  Last 30 days  Usage- 100  Usage > 4 hrs - 100  AHI - 0.6        5/14/2024     8:46 AM   EPWORTH SLEEPINESS SCALE   Sitting and reading 0   Watching TV 0   Sitting, inactive in a public place (e.g. a theatre or a meeting) 1   As a passenger in a car for an hour without a break 0   Lying down to rest in the afternoon when circumstances permit 3   Sitting and talking to someone 0   Sitting quietly after a lunch without alcohol 3   In a car, while stopped for a few minutes in traffic 0   Total score 7        ASSESSMENT /PLAN:    Problem List Items Addressed This Visit          Other    SANDIP (obstructive sleep apnea) - Primary    - Continues to benefit from PAP therapy. Encouraged nightly use of PAP.   - Drowsy driving may still occur despite PAP use.   - F/u in 1 yr         Questions and concerns were sought and answered to the patient's stated verbal satisfaction.    The patient verbalizes understanding and agreement with the above stated treatment plan.   Items discussed include acute and/or chronic neurological, sleep, or other issues and their attendant differential diagnoses.  Potential for additional testing, treatment options, and prognosis also discussed.    __*_single dx ___multiple issues/ diagnoses  ___ low __* mod ___ high complexity of data  __*_low __mod ___ high risks     Medical Decision Making (MDM) used for CPT choice:  _*__low  ___moderate  ____high        VINICIO Dumont  [facility]  [phone]

## 2025-05-15 ENCOUNTER — OFFICE VISIT (OUTPATIENT)
Dept: NEUROLOGY | Facility: CLINIC | Age: 65
End: 2025-05-15
Payer: MEDICARE

## 2025-05-15 VITALS
SYSTOLIC BLOOD PRESSURE: 136 MMHG | BODY MASS INDEX: 35.21 KG/M2 | WEIGHT: 260 LBS | DIASTOLIC BLOOD PRESSURE: 88 MMHG | HEIGHT: 72 IN

## 2025-05-15 DIAGNOSIS — G47.33 OSA (OBSTRUCTIVE SLEEP APNEA): Primary | ICD-10-CM

## 2025-05-15 PROCEDURE — 99213 OFFICE O/P EST LOW 20 MIN: CPT | Mod: PBBFAC | Performed by: NURSE PRACTITIONER

## 2025-05-15 PROCEDURE — 99213 OFFICE O/P EST LOW 20 MIN: CPT | Mod: S$PBB,,, | Performed by: NURSE PRACTITIONER

## 2025-05-15 PROCEDURE — 99999 PR PBB SHADOW E&M-EST. PATIENT-LVL III: CPT | Mod: PBBFAC,,, | Performed by: NURSE PRACTITIONER

## 2025-05-15 RX ORDER — FLUTICASONE PROPIONATE 50 MCG
1 SPRAY, SUSPENSION (ML) NASAL DAILY PRN
COMMUNITY

## 2025-05-15 RX ORDER — RANOLAZINE 500 MG/1
500 TABLET, EXTENDED RELEASE ORAL 2 TIMES DAILY
COMMUNITY
Start: 2025-04-09

## 2025-05-15 RX ORDER — LEVOCETIRIZINE DIHYDROCHLORIDE 5 MG/1
5 TABLET, FILM COATED ORAL EVERY MORNING
COMMUNITY

## 2025-05-15 RX ORDER — METOPROLOL SUCCINATE 50 MG/1
50 TABLET, EXTENDED RELEASE ORAL
COMMUNITY
Start: 2025-05-12

## 2025-05-15 NOTE — PROGRESS NOTES
Established SANDIP Patient   SUBJECTIVE:    Patient ID: Spenser Craven , 65 y.o.    Past Medical History:   Diagnosis Date    A-fib     BPH (benign prostatic hyperplasia)     HTN (hypertension)     MVP (mitral valve prolapse)        Past Surgical History:   Procedure Laterality Date    CARDIAC ELECTROPHYSIOLOGY STUDY AND ABLATION      CORONARY ARTERY BYPASS GRAFT      LUMBAR DISC SURGERY      MITRAL VALVE REPLACEMENT         Review of patient's allergies indicates:   Allergen Reactions    Ace inhibitors Swelling    Simvastatin Other (See Comments)       Chief Complaint: SANDIP f/u    History of Present Illness:     Here for PAP follow up. Sleeping well at night with PAP. Feels better the following day after PAP use; denies EDS.     Denies problems with PAP machine. [place] get [place] [facility] [place] of Systems - as per HPI, otherwise a balanced 10 systems review is negative.      Current Medications:  Current Outpatient Medications   Medication Instructions    aspirin (ECOTRIN) 81 mg, Daily    fluticasone propionate (FLONASE) 50 mcg/actuation nasal spray 1 spray, Daily PRN    levocetirizine (XYZAL) 5 mg, Every morning    losartan-hydrochlorothiazide 100-25 mg (HYZAAR) 100-25 mg per tablet 1 tablet, Daily    metoprolol succinate (TOPROL-XL) 50 mg    ranolazine (RANEXA) 500 mg, 2 times daily    rosuvastatin (CRESTOR) 40 mg, Daily       OBJECTIVE:    Vitals:  /88 (BP Location: Right arm, Patient Position: Sitting)   Ht 6' (1.829 m)   Wt 117.9 kg (260 lb)   BMI 35.26 kg/m²      Physical Exam:  Constitutional  he appears well-developed and well-nourished. he is well groomed.    Accompanied by - self  Appearance - well appearing, no apparent distress, unassisted  Heart - RRR auscultated without murmur  Skin- no obvious lesions noted    Neurologic  Cortical function - The patient is alert, attentive   Speech - clear   Cranial nerves:  CN 3, 4, 6 EOMs - normal. No ptosis or lateral gaze deviation  CN 7 - no face  asymmetry   CN 8 - hearing is grossly normal  Gait - unassisted; posture upright. gait is steady with normal steps    Review of Data:      PAP Compliance Report  4/7/2025-5/6/2025  Usage days >=4 hrs 100%   AHI: 0.8        5/15/2025     8:36 AM   EPWORTH SLEEPINESS SCALE   Sitting and reading 1   Watching TV 1   Sitting, inactive in a public place (e.g. a theatre or a meeting) 1   As a passenger in a car for an hour without a break 1   Lying down to rest in the afternoon when circumstances permit 2   Sitting and talking to someone 1   Sitting quietly after a lunch without alcohol 1   In a car, while stopped for a few minutes in traffic 0   Total score 8     ASSESSMENT /PLAN:    Problem List Items Addressed This Visit          Other    SANDIP (obstructive sleep apnea) - Primary    - Continues to benefit from PAP therapy. Encouraged nightly use of PAP.   - Drowsy driving may still occur despite PAP use.   - F/u in 1 yr         Questions and concerns were sought and answered to the patient's stated verbal satisfaction.    The patient verbalizes understanding and agreement with the above stated treatment plan.   Items discussed include acute and/or chronic neurological, sleep, or other issues and their attendant differential diagnoses.  Potential for additional testing, treatment options, and prognosis also discussed.    __*_single dx ___multiple issues/ diagnoses  ___ low __* mod ___ high complexity of data  __*_low __mod ___ high risks     Medical Decision Making (MDM) used for CPT choice:  _*__low  ___moderate  ____high        VINICIO Dumotn  [facility]  [phone]